# Patient Record
Sex: MALE | Race: BLACK OR AFRICAN AMERICAN | NOT HISPANIC OR LATINO | Employment: UNEMPLOYED | URBAN - METROPOLITAN AREA
[De-identification: names, ages, dates, MRNs, and addresses within clinical notes are randomized per-mention and may not be internally consistent; named-entity substitution may affect disease eponyms.]

---

## 2024-02-28 ENCOUNTER — HOSPITAL ENCOUNTER (EMERGENCY)
Facility: HOSPITAL | Age: 24
Discharge: HOME | End: 2024-02-28
Attending: EMERGENCY MEDICINE
Payer: COMMERCIAL

## 2024-02-28 VITALS
WEIGHT: 170 LBS | RESPIRATION RATE: 15 BRPM | SYSTOLIC BLOOD PRESSURE: 157 MMHG | HEART RATE: 102 BPM | DIASTOLIC BLOOD PRESSURE: 84 MMHG | OXYGEN SATURATION: 100 % | TEMPERATURE: 97.5 F

## 2024-02-28 DIAGNOSIS — R11.0 NAUSEA: Primary | ICD-10-CM

## 2024-02-28 DIAGNOSIS — U07.1 COVID: ICD-10-CM

## 2024-02-28 PROCEDURE — 2500000002 HC RX 250 W HCPCS SELF ADMINISTERED DRUGS (ALT 637 FOR MEDICARE OP, ALT 636 FOR OP/ED): Mod: SE

## 2024-02-28 PROCEDURE — 99284 EMERGENCY DEPT VISIT MOD MDM: CPT | Performed by: EMERGENCY MEDICINE

## 2024-02-28 PROCEDURE — 36415 COLL VENOUS BLD VENIPUNCTURE: CPT

## 2024-02-28 PROCEDURE — 87636 SARSCOV2 & INF A&B AMP PRB: CPT

## 2024-02-28 PROCEDURE — 2500000001 HC RX 250 WO HCPCS SELF ADMINISTERED DRUGS (ALT 637 FOR MEDICARE OP): Mod: SE

## 2024-02-28 PROCEDURE — 2500000005 HC RX 250 GENERAL PHARMACY W/O HCPCS: Mod: SE

## 2024-02-28 PROCEDURE — 99283 EMERGENCY DEPT VISIT LOW MDM: CPT

## 2024-02-28 RX ORDER — METOCLOPRAMIDE 10 MG/1
5 TABLET ORAL EVERY 8 HOURS PRN
Qty: 30 TABLET | Refills: 0 | Status: SHIPPED | OUTPATIENT
Start: 2024-02-28 | End: 2024-03-12 | Stop reason: WASHOUT

## 2024-02-28 RX ORDER — FAMOTIDINE 20 MG/1
20 TABLET, FILM COATED ORAL 2 TIMES DAILY
Qty: 30 TABLET | Refills: 0 | Status: SHIPPED | OUTPATIENT
Start: 2024-02-28 | End: 2024-03-12 | Stop reason: WASHOUT

## 2024-02-28 RX ORDER — ONDANSETRON 4 MG/1
4 TABLET, ORALLY DISINTEGRATING ORAL ONCE
Status: COMPLETED | OUTPATIENT
Start: 2024-02-28 | End: 2024-02-28

## 2024-02-28 RX ORDER — OMEPRAZOLE 20 MG/1
20 CAPSULE, DELAYED RELEASE ORAL DAILY
Qty: 30 CAPSULE | Refills: 0 | Status: SHIPPED | OUTPATIENT
Start: 2024-02-28 | End: 2024-03-12 | Stop reason: WASHOUT

## 2024-02-28 RX ORDER — PANTOPRAZOLE SODIUM 40 MG/1
40 TABLET, DELAYED RELEASE ORAL ONCE
Status: COMPLETED | OUTPATIENT
Start: 2024-02-28 | End: 2024-02-28

## 2024-02-28 RX ORDER — PANTOPRAZOLE SODIUM 40 MG/1
40 TABLET, DELAYED RELEASE ORAL
Status: DISCONTINUED | OUTPATIENT
Start: 2024-02-29 | End: 2024-02-28

## 2024-02-28 RX ORDER — FAMOTIDINE 40 MG/1
20 TABLET, FILM COATED ORAL ONCE
Status: COMPLETED | OUTPATIENT
Start: 2024-02-28 | End: 2024-02-28

## 2024-02-28 RX ORDER — ONDANSETRON 4 MG/1
4 TABLET, ORALLY DISINTEGRATING ORAL EVERY 8 HOURS PRN
Qty: 15 TABLET | Refills: 0 | Status: SHIPPED | OUTPATIENT
Start: 2024-02-28 | End: 2024-02-28 | Stop reason: ALTCHOICE

## 2024-02-28 RX ADMIN — PANTOPRAZOLE SODIUM 40 MG: 40 TABLET, DELAYED RELEASE ORAL at 16:48

## 2024-02-28 RX ADMIN — FAMOTIDINE 20 MG: 40 TABLET ORAL at 16:37

## 2024-02-28 RX ADMIN — ONDANSETRON 4 MG: 4 TABLET, ORALLY DISINTEGRATING ORAL at 16:36

## 2024-02-28 ASSESSMENT — COLUMBIA-SUICIDE SEVERITY RATING SCALE - C-SSRS
6. HAVE YOU EVER DONE ANYTHING, STARTED TO DO ANYTHING, OR PREPARED TO DO ANYTHING TO END YOUR LIFE?: NO
2. HAVE YOU ACTUALLY HAD ANY THOUGHTS OF KILLING YOURSELF?: NO
1. IN THE PAST MONTH, HAVE YOU WISHED YOU WERE DEAD OR WISHED YOU COULD GO TO SLEEP AND NOT WAKE UP?: NO

## 2024-02-28 ASSESSMENT — PAIN SCALES - GENERAL: PAINLEVEL_OUTOF10: 0 - NO PAIN

## 2024-02-28 ASSESSMENT — PAIN - FUNCTIONAL ASSESSMENT: PAIN_FUNCTIONAL_ASSESSMENT: 0-10

## 2024-02-28 NOTE — ED TRIAGE NOTES
Pt c/o nausea without emesis, and fatigue x1 wk. No acute change today. Pt denies any other symptoms or known sick contacts.

## 2024-02-28 NOTE — DISCHARGE INSTRUCTIONS
Please return to the emergency department, should you have any worsening symptoms, are unable to get an appointment with your primary care physician within the discussed time frame, or have any further concerns.       Please follow up with:  PCP as scheduled.     You make take ibuprofen or tylenol for pain. You may alternate ibuprofen and tylenol every 6 hours for better pain control.    Do not exceed 2400mg of ibuprofen or 4000mg of tylenol in a 24 hour period.      If you were given antibiotics, please complete the entire course. If you are unable to tolerate your antibiotics, please follow up with your primary care doctor or return to the emergency department.

## 2024-02-28 NOTE — ED PROVIDER NOTES
"HPI:      Limitations to History: N/a  Additional History Obtained from: None  External records reviewed: prior EMR notes    Chief Complaint   Patient presents with    Nausea    Fatigue          Gabriel Lin is a 23 y.o. male with no prior medical history presents the ED with nausea while he was playing games roughly 10 days ago.  He was seen at [facility] on 2/10/2024 for this complaint, and improved with Zofran, advised to follow-up with primary care physician.  Overall patient notes about symptoms of nausea that, and resolved suddenly, occasionally associated with a \"weird feeling\" that patient describes as lightheadedness.  Notes questionable congestion today, which is new over the last few days.  Denies any other symptoms, no fevers, chills, cough, sore throat, chest pain, shortness of breath, abdominal pain, vomiting, diarrhea, constipation, or any tingling or numbness in the extremities.       Past Medical History:   Diagnosis Date    Encounter for screening for disorder due to exposure to contaminants     Screening for lead exposure    Pain in unspecified ankle and joints of unspecified foot     Ankle joint pain     Past Surgical History:   Procedure Laterality Date    COLOSTOMY  11/11/2013    Colostomy        No Known Allergies      --------------------------------------------------------------------------------------------------------------------------------------    VS: As documented in the triage note and EMR flowsheet from this visit were reviewed.  Temp 36.4 °C (97.5 °F) HR (!) 102 /84 RR 15 Sat 100 % on      Physical Exam:  GEN:  no acute distress, appears comfortable. Conversational and appropriate.    HEENT: Normocephalic, atraumatic. Conjunctiva pink with no redness or exudates. Hearing grossly intact. Moist mucous membranes.  No nystagmus, EOMI, PERRLA.  CARDIO: Normal rate and regular rhythm. Normal S1, S2  without murmurs, rubs, or gallops.   PULM: Clear to auscultation bilaterally. No " rales, rhonchi, or wheezes. No accessory muscle use or stridor. Speaking in full sentences.  GI: Soft, non-tender, non-distended. No rebound tenderness or guarding.   SKIN: Warm and dry, no rashes, lesions, petechiae, or purpura.  MSK: ROM intact in all 4 extremities without contractures or pain. No peripheral edema, contusions, or wounds.    NEURO: A&Ox3, No focal findings identified. No confusion or gross mental status changes.  PSYCH: Appropriate mood and behavior, converses and responds appropriately during exam.        ---------------------------------------------------------------------------------------------------------------------------------------    Given in the ED:   Medications   famotidine (Pepcid) tablet 20 mg (20 mg oral Given 2/28/24 1637)   ondansetron ODT (Zofran-ODT) disintegrating tablet 4 mg (4 mg oral Given 2/28/24 1636)   pantoprazole (ProtoNix) EC tablet 40 mg (40 mg oral Given 2/28/24 1648)      Work up: All labs and imaging were independently reviewed by me.    Labs Reviewed   CBC   SARS-COV-2 AND INFLUENZA A/B PCR       MDM:  Briefly, this is a otherwise healthy 23-year-old male who presented with nausea and lightheadedness over the last 2 weeks, was seen here, given Zofran, Pepcid, and Protonix with improvement of his symptoms.  Low suspicion for intracranial abnormality given no specific triggers for nausea, not associate with any vomiting, with steady gait and non-focal deficits.  Symptoms are likely related to reflux, as patient notes that he has previously had reflux, but has stopped taking his proton pump inhibitor.  Swabs for influenza were also performed to rule out viral illness due his symptoms.  Patient was previously seen at [facility], was given Zofran, and his symptoms improved.  Advised him to follow-up with his primary care, noted that he has a primary care appointment scheduled in the near future.    Impression:   1. Nausea        Discharge Medications: Zofran, famotidine, and  omeprazole    Social Determinants of Health: Patient is currently self-employed, denies any issues with transportation.  Notes that he is able to follow-up with primary care, and has a future appointment scheduled.    Plan and Disposition: discharge home, advised return if worse. They understand return precautions and discharge instructions. Patient was in agreement with this plan.        Ekta Vasquez DO  Emergency Medicine, PGY-2    Patient was seen and evaluated by the attending physician. The attending ED physician agrees with the plan. Patient and/or patient´s representative was counseled regarding labs, imaging, likely diagnosis, and plan. All questions were answered.  Disclaimer: This note was dictated by speech recognition.  Attempt at proofreading was made to minimize errors.  Errors in transcription may be present.  Please call if questions.     Ekta Vasquez DO  Resident  02/28/24 1740       Ekta Vasquez DO  Resident  02/28/24 1803

## 2024-02-29 LAB
FLUAV RNA RESP QL NAA+PROBE: NOT DETECTED
FLUBV RNA RESP QL NAA+PROBE: NOT DETECTED
SARS-COV-2 RNA RESP QL NAA+PROBE: DETECTED

## 2024-03-01 ENCOUNTER — TELEPHONE (OUTPATIENT)
Dept: PHARMACY | Facility: HOSPITAL | Age: 24
End: 2024-03-01
Payer: COMMERCIAL

## 2024-03-01 NOTE — PROGRESS NOTES
"EDPD Note: Rapid Result Review    Reviewed Mr. Gabriel Lin 's chart regarding a positive COVID-19 result that was taken during their recent emergency room visit. The patient was told about these results prior to leaving the emergency department. Therefore, patient was contacted and given proper education.     \"Patient occasionally associated with a \"weird feeling\" that patient describes as lightheadedness, and notes questionable congestion today, which is new over the last few days \"    Patient informed of the results at the ED and I educated him about symptomatic OTC products for nasal congestion and cough. Patient confirmed understanding and to return to the ED in case of worsening symptoms     Latest Reference Range & Units Most Recent   Coronavirus 2019, PCR Not Detected  Detected !  2/28/24 16:32   !: Data is abnormal      No further follow up needed from EDPD Team.     Gamaliel Currie, PharmD      "

## 2024-03-12 ENCOUNTER — OFFICE VISIT (OUTPATIENT)
Dept: PRIMARY CARE | Facility: CLINIC | Age: 24
End: 2024-03-12
Payer: COMMERCIAL

## 2024-03-12 ENCOUNTER — LAB (OUTPATIENT)
Dept: LAB | Facility: LAB | Age: 24
End: 2024-03-12
Payer: COMMERCIAL

## 2024-03-12 VITALS
HEIGHT: 73 IN | BODY MASS INDEX: 21.34 KG/M2 | SYSTOLIC BLOOD PRESSURE: 123 MMHG | WEIGHT: 161 LBS | HEART RATE: 112 BPM | DIASTOLIC BLOOD PRESSURE: 76 MMHG | OXYGEN SATURATION: 100 %

## 2024-03-12 DIAGNOSIS — K29.70 GASTRITIS WITHOUT BLEEDING, UNSPECIFIED CHRONICITY, UNSPECIFIED GASTRITIS TYPE: Primary | ICD-10-CM

## 2024-03-12 DIAGNOSIS — E83.52 HYPERCALCEMIA: ICD-10-CM

## 2024-03-12 DIAGNOSIS — G58.9 PINCHED NERVE IN NECK: ICD-10-CM

## 2024-03-12 DIAGNOSIS — Z00.00 HEALTH CARE MAINTENANCE: ICD-10-CM

## 2024-03-12 DIAGNOSIS — K29.70 GASTRITIS WITHOUT BLEEDING, UNSPECIFIED CHRONICITY, UNSPECIFIED GASTRITIS TYPE: ICD-10-CM

## 2024-03-12 DIAGNOSIS — H61.23 BILATERAL IMPACTED CERUMEN: ICD-10-CM

## 2024-03-12 DIAGNOSIS — R42 DIZZINESS: ICD-10-CM

## 2024-03-12 LAB
25(OH)D3 SERPL-MCNC: 16 NG/ML (ref 30–100)
CA-I BLD-SCNC: 1.22 MMOL/L (ref 1.1–1.33)
PTH-INTACT SERPL-MCNC: 34 PG/ML (ref 18.5–88)

## 2024-03-12 PROCEDURE — 36415 COLL VENOUS BLD VENIPUNCTURE: CPT

## 2024-03-12 PROCEDURE — 82330 ASSAY OF CALCIUM: CPT

## 2024-03-12 PROCEDURE — 82306 VITAMIN D 25 HYDROXY: CPT

## 2024-03-12 PROCEDURE — 83970 ASSAY OF PARATHORMONE: CPT

## 2024-03-12 PROCEDURE — 1036F TOBACCO NON-USER: CPT | Performed by: INTERNAL MEDICINE

## 2024-03-12 PROCEDURE — 99204 OFFICE O/P NEW MOD 45 MIN: CPT | Performed by: INTERNAL MEDICINE

## 2024-03-12 RX ORDER — ONDANSETRON 4 MG/1
4 TABLET, ORALLY DISINTEGRATING ORAL EVERY 8 HOURS PRN
Qty: 20 TABLET | Refills: 0 | Status: SHIPPED | OUTPATIENT
Start: 2024-03-12 | End: 2024-03-19

## 2024-03-12 RX ORDER — OMEPRAZOLE 40 MG/1
40 CAPSULE, DELAYED RELEASE ORAL
Qty: 30 CAPSULE | Refills: 0 | Status: SHIPPED | OUTPATIENT
Start: 2024-03-12 | End: 2024-04-10

## 2024-03-12 ASSESSMENT — PATIENT HEALTH QUESTIONNAIRE - PHQ9
2. FEELING DOWN, DEPRESSED OR HOPELESS: NOT AT ALL
1. LITTLE INTEREST OR PLEASURE IN DOING THINGS: NOT AT ALL
SUM OF ALL RESPONSES TO PHQ9 QUESTIONS 1 AND 2: 0

## 2024-03-12 NOTE — ASSESSMENT & PLAN NOTE
May be related to the cerumen impaction.  Recommend Debrox 3 drops 3 times a day for 5 days.  Referral to ENT for removal

## 2024-03-12 NOTE — PROGRESS NOTES
Subjective   Patient ID: Gabriel Lin is a 23 y.o. male who presents for No chief complaint on file..    HPI       Patient is a 23-year-old male with no significant past medical history presents as follow-up from recent ER visit.  Patient went to the emergency room due to dizziness and epigastric pain.  He was given famotidine and Reglan however no significant improvement.  He states that the symptoms are worse when he eats.  Notes particular foods cause more symptoms.  He is never seen a gastroenterologist.    Patient also complaining of the left shoulder pinched nerve.  He states when the pain is present it is quite quick to him and causes weakness in the arm.  It goes away quite quickly currently no weakness in the arm     patient also noted to have an elevated calcium in the emergency room.  He does not take vitamin D Supplementation          Review of Systems  Constitutional: No fever or chills  Cardiovascular: no chest pain, no palpitations and no syncope.   Respiratory: no cough, no shortness of breath during exertion and no shortness of breath at rest.   Gastrointestinal: no abdominal pain, no nausea and no vomiting.  Neuro: No Headache, no dizziness    Objective   There were no vitals taken for this visit.    Physical Exam  Constitutional: Alert and in no acute distress. Well developed, well nourished  Head and Face: Head and face: Normal.    Cardiovascular: Heart rate and rhythm were normal, normal S1 and S2. No peripheral edema.   Pulmonary: No respiratory distress. Clear bilateral breath sounds.  Musculoskeletal: Gait and station: Normal. Muscle strength/tone: Normal.   Skin: Normal skin color and pigmentation, normal skin turgor, and no rash.    Psychiatric: Judgment and insight: Intact. Mood and affect: Normal.    Procedures    Lab Results   Component Value Date    WBC 8.2 04/13/2023    HGB 16.0 04/13/2023    HCT 45.2 04/13/2023     04/13/2023    ALT 11 03/30/2023    AST 20 03/30/2023    NA  140 04/13/2023    K 4.0 04/13/2023     04/13/2023    CREATININE 1.11 04/13/2023    BUN 11 04/13/2023    CO2 28 04/13/2023       CT soft tissue neck w IV contrast  Narrative: Interpreted By:  SILVINA YEN MD  MRN: [id number]  Patient Name: ALL DEAN     STUDY:  CT NECK WITH CONTRAST;  4/13/2023 4:41 pm     INDICATION:  previous CT neck ~2 weeks ago given sore throat. Concern for  R-sided PTA, Lie Flat: Yes .     COMPARISON:  03/31/2023.     ACCESSION NUMBER(S):  [id number]     ORDERING CLINICIAN:  RENARD STAFFORD     TECHNIQUE:  Axial CT images of the neck were obtained.  The patient received 75  mL Omnipaque 350 intravenous contrast agent. The images were  reformatted in angled axial, coronal and sagittal planes.     FINDINGS:  Limited intracranial images demonstrates no significant mass effect.  Visualized orbits and globes are unremarkable.     Nasopharynx is unremarkable.     There is asymmetric enlargement of the right palatine tonsil which is  likely secondary to an infectious/inflammatory process. There is a  1.5 x 0.9 x 1.6 cm rim enhancing fluid collection within the right  peritonsillar region compatible with an abscess. There is mild  narrowing of the airway at the level of the tonsils.     Mild prominence of the left palatine tonsils likely reactive. Limited  evaluation of the oral cavity secondary to dental amalgam artifact.  Base of tongue and epiglottis are unremarkable. Asymmetry within the  hypopharynx likely represents coaptation of mucosal surfaces on the  right. The vocal cords are adducted which limits evaluation.  Subglottic trachea is unremarkable.     There is expected enhancement within the carotid sheath structures.  Bilateral borderline lymph nodes are likely reactive in the setting  of infection and or inflammation.     Thyroid, submandibular glands and parotid glands are unremarkable.     Evaluation through the lung apices are predominantly clear. Regional  osseous structures are  unremarkable.     Mild mucosal thickening within the paranasal sinuses. Odontogenic  disease. There is a periapical lucency with breech of the buccal  cortex involving a mandibular molar on the right.     Impression: Asymmetric enlargement of the right palatine tonsil which is likely  secondary to an infectious/inflammatory process.     There is a 1.5 x 0.9 x 1.6 cm rim enhancing fluid collection within  the right peritonsillar region compatible with an abscess. There is  mild narrowing of the airway at the level of the tonsils.     Prominent lymph nodes bilaterally are likely reactive in the setting  of infection and or inflammation.     Odontogenic disease.            Assessment/Plan   Problem List Items Addressed This Visit             ICD-10-CM    Gastritis without bleeding - Primary K29.70     Start omeprazole 40.  Recommend small frequent meals.  Avoid eating 2 hours before bed.  Follow-up 1 month.  If no improvement consider referral to gastroenterology         Relevant Medications    omeprazole (PriLOSEC) 40 mg DR capsule    ondansetron ODT (Zofran-ODT) 4 mg disintegrating tablet    Other Relevant Orders    Vitamin D 25-Hydroxy,Total (for eval of Vitamin D levels)    Hypercalcemia E83.52     Check PTH vitamin D and ionized calcium.  Follow-up 1 month for reevaluation         Relevant Orders    Calcium, ionized    PTH, intact    Pinched nerve in neck G58.9     Referral to massage therapy         Relevant Orders    Referral to [facility]    Dizziness R42     May be related to the cerumen impaction.  Recommend Debrox 3 drops 3 times a day for 5 days.  Referral to ENT for removal          Other Visit Diagnoses         Codes    Health care maintenance     Z00.00    Relevant Orders    Referral to Ophthalmology    Bilateral impacted cerumen     H61.23    Relevant Orders    Referral to ENT

## 2024-03-12 NOTE — ASSESSMENT & PLAN NOTE
Start omeprazole 40.  Recommend small frequent meals.  Avoid eating 2 hours before bed.  Follow-up 1 month.  If no improvement consider referral to gastroenterology

## 2024-03-15 ENCOUNTER — HOSPITAL ENCOUNTER (EMERGENCY)
Facility: HOSPITAL | Age: 24
Discharge: HOME | End: 2024-03-15
Attending: EMERGENCY MEDICINE
Payer: COMMERCIAL

## 2024-03-15 VITALS
HEART RATE: 68 BPM | OXYGEN SATURATION: 99 % | TEMPERATURE: 97.6 F | BODY MASS INDEX: 21.2 KG/M2 | SYSTOLIC BLOOD PRESSURE: 127 MMHG | RESPIRATION RATE: 14 BRPM | WEIGHT: 160 LBS | HEIGHT: 73 IN | DIASTOLIC BLOOD PRESSURE: 66 MMHG

## 2024-03-15 DIAGNOSIS — R20.2 PARESTHESIA: Primary | ICD-10-CM

## 2024-03-15 PROCEDURE — 99281 EMR DPT VST MAYX REQ PHY/QHP: CPT

## 2024-03-15 PROCEDURE — 99283 EMERGENCY DEPT VISIT LOW MDM: CPT | Performed by: EMERGENCY MEDICINE

## 2024-03-15 PROCEDURE — 99283 EMERGENCY DEPT VISIT LOW MDM: CPT

## 2024-03-15 ASSESSMENT — PAIN - FUNCTIONAL ASSESSMENT: PAIN_FUNCTIONAL_ASSESSMENT: 0-10

## 2024-03-15 NOTE — DISCHARGE INSTRUCTIONS
Please follow-up with your primary care physician.  Return to emergency department for reassessment if new or worsening symptoms.  Especially persistent weakness or numbness, severe headache, vision changes.

## 2024-03-15 NOTE — ED TRIAGE NOTES
Pt states whenever he moves his neck the wring way he feels L. Sided weakness, unsure if its a pinch nerve. Pt denies injury to the area. No lost of B/B

## 2024-03-15 NOTE — ED PROVIDER NOTES
CC: Neck Pain and Weakness, Gen     HPI:  Patient is a 23-year-old male who presents for evaluation of intermittent left upper extremity weakness.  This is been going on for 2 to 3 weeks and will happen at least once a day.He denies any inciting factors including heavy lifting or injury.  He is not having any current numbness tingling or weakness.  These episodes last for a few minutes and then cease.  He states that this will happen when he turns his head a certain way and it is not always the same direction.  He did see his primary care doctor regarding this a couple of days ago and was referred to massage therapy.  He is here with another family member who is also getting checked out in the ED and wanted to come for a second opinion.    Records Reviewed:  Recent available ED and inpatient notes reviewed in EMR.    PMHx/PSHx:  Per HPI.   - has a past medical history of Encounter for screening for disorder due to exposure to contaminants and Pain in unspecified ankle and joints of unspecified foot.  - has a past surgical history that includes Colostomy (11/11/2013).    Medications:  Reviewed in EMR. See EMR for complete list of medications and doses.    Allergies:  Ragweed    Social History:  - Tobacco:  reports that he has never smoked. He has never been exposed to tobacco smoke. He has never used smokeless tobacco.   - Alcohol:  reports current alcohol use.   - Illicit Drugs:  reports no history of drug use.     ROS:  Per HPI.     Physical Exam  Vitals and nursing note reviewed.   Constitutional:       General: He is not in acute distress.     Appearance: Normal appearance. He is well-developed and normal weight.   HENT:      Head: Normocephalic and atraumatic.      Mouth/Throat:      Mouth: Mucous membranes are moist.   Eyes:      Extraocular Movements: Extraocular movements intact.      Conjunctiva/sclera: Conjunctivae normal.      Pupils: Pupils are equal, round, and reactive to light.   Cardiovascular:       Rate and Rhythm: Normal rate and regular rhythm.      Pulses: Normal pulses.      Heart sounds: No murmur heard.  Pulmonary:      Effort: Pulmonary effort is normal. No respiratory distress.      Breath sounds: Normal breath sounds.   Abdominal:      Palpations: Abdomen is soft.      Tenderness: There is no abdominal tenderness. There is no guarding.   Musculoskeletal:         General: No swelling, tenderness or deformity.      Cervical back: Normal range of motion and neck supple. No tenderness.      Right lower leg: No edema.      Left lower leg: No edema.   Skin:     General: Skin is warm and dry.      Capillary Refill: Capillary refill takes less than 2 seconds.   Neurological:      Mental Status: He is alert and oriented to person, place, and time. Mental status is at baseline.      GCS: GCS eye subscore is 4. GCS verbal subscore is 5. GCS motor subscore is 6.      Cranial Nerves: Cranial nerves 2-12 are intact. No cranial nerve deficit, dysarthria or facial asymmetry.      Sensory: Sensation is intact. No sensory deficit.      Motor: Motor function is intact. No weakness.      Coordination: Coordination is intact. Coordination normal.      Gait: Gait is intact. Gait normal.      Deep Tendon Reflexes: Reflexes are normal and symmetric.   Psychiatric:         Mood and Affect: Mood normal.         Behavior: Behavior normal.           Assessment and Plan:  Patient is a 23-year-old male who presents for evaluation of intermittent weakness in his left upper extremity that is been going on for a couple of weeks.  He states that this has been coming and going.  He is not currently having the symptoms.  He is hemodynamically stable on arrival though tachycardic in triage.  On repeat vitals he is heart rate of 68.  Most likely related to ambulation into the emergency department.  He is not currently having any symptoms of weakness or paresthesias.  Attempted provocative neck flexion extension sidebending and rotation  without any change in neuroexam.  He has intact sensation pulses and strength in bilateral upper extremities.  He has not had any injuries to the neck no manipulation no recent heavy lifting.  On chart review he has had MRIs and CT imaging over the last couple of years with some concerns for demyelinating process without focal findings on these imaging.  With these negative imaging, while considered imaging today I have low concern for fracture that would be seen on x-ray or CT and low concern for acute process requiring emergent MRI.  Patient is not in any pain requiring any medical intervention.  Patient to be discharged in stable condition with referral to primary care and neurology for further outpt workup.  Patient discharged home in stable condition.    ED Course:  Diagnoses as of 03/15/24 1916   Paresthesia      Pt seen and discussed with Dr. Ghislaine Desai DO  PGY-2 Emergency Medicine        Tatiana Desai DO  Resident  03/15/24 1932

## 2024-04-08 DIAGNOSIS — K29.70 GASTRITIS WITHOUT BLEEDING, UNSPECIFIED CHRONICITY, UNSPECIFIED GASTRITIS TYPE: ICD-10-CM

## 2024-04-10 RX ORDER — OMEPRAZOLE 40 MG/1
40 CAPSULE, DELAYED RELEASE ORAL
Qty: 30 CAPSULE | Refills: 0 | Status: SHIPPED | OUTPATIENT
Start: 2024-04-10 | End: 2024-05-10

## 2024-04-12 ENCOUNTER — APPOINTMENT (OUTPATIENT)
Dept: PRIMARY CARE | Facility: CLINIC | Age: 24
End: 2024-04-12
Payer: COMMERCIAL

## 2024-05-02 ENCOUNTER — OFFICE VISIT (OUTPATIENT)
Dept: PRIMARY CARE | Facility: CLINIC | Age: 24
End: 2024-05-02
Payer: COMMERCIAL

## 2024-05-02 VITALS
SYSTOLIC BLOOD PRESSURE: 148 MMHG | HEART RATE: 99 BPM | DIASTOLIC BLOOD PRESSURE: 82 MMHG | BODY MASS INDEX: 20.45 KG/M2 | WEIGHT: 155 LBS | OXYGEN SATURATION: 98 %

## 2024-05-02 DIAGNOSIS — K29.70 GASTRITIS WITHOUT BLEEDING, UNSPECIFIED CHRONICITY, UNSPECIFIED GASTRITIS TYPE: Primary | ICD-10-CM

## 2024-05-02 PROCEDURE — 1036F TOBACCO NON-USER: CPT | Performed by: INTERNAL MEDICINE

## 2024-05-02 PROCEDURE — 99213 OFFICE O/P EST LOW 20 MIN: CPT | Performed by: INTERNAL MEDICINE

## 2024-05-02 NOTE — PROGRESS NOTES
Subjective   Patient ID: Gabriel Lin is a 23 y.o. male who presents for Follow-up.    HPI       Patient is a 23-year-old male with no significant past medical history presents as follow-up.  Last visit patient complaining of epigastric pain thought to be due to gastritis.  Patient was started on a PPI for 30 days.  Symptoms for the most part had resolved.  He is doing well off the medication at this time.          Review of Systems  Constitutional: No fever or chills  Cardiovascular: no chest pain, no palpitations and no syncope.   Respiratory: no cough, no shortness of breath during exertion and no shortness of breath at rest.   Gastrointestinal: no abdominal pain, no nausea and no vomiting.  Neuro: No Headache, no dizziness    Objective   /82   Pulse 99   Wt 70.3 kg (155 lb)   SpO2 98%   BMI 20.45 kg/m²     Physical Exam  Constitutional: Alert and in no acute distress. Well developed, well nourished  Head and Face: Head and face: Normal.    Cardiovascular: Heart rate and rhythm were normal, normal S1 and S2. No peripheral edema.   Pulmonary: No respiratory distress. Clear bilateral breath sounds.  Musculoskeletal: Gait and station: Normal. Muscle strength/tone: Normal.   Skin: Normal skin color and pigmentation, normal skin turgor, and no rash.    Psychiatric: Judgment and insight: Intact. Mood and affect: Normal.    Procedures    Lab Results   Component Value Date    WBC 8.2 04/13/2023    HGB 16.0 04/13/2023    HCT 45.2 04/13/2023     04/13/2023    ALT 11 03/30/2023    AST 20 03/30/2023     04/13/2023    K 4.0 04/13/2023     04/13/2023    CREATININE 1.11 04/13/2023    BUN 11 04/13/2023    CO2 28 04/13/2023       CT soft tissue neck w IV contrast  Narrative: Interpreted By:  SILVINA YEN MD  MRN: [id number]  Patient Name: GABRIEL LIN     STUDY:  CT NECK WITH CONTRAST;  4/13/2023 4:41 pm     INDICATION:  previous CT neck ~2 weeks ago given sore throat. Concern for  R-sided  PTA, Lie Flat: Yes .     COMPARISON:  03/31/2023.     ACCESSION NUMBER(S):  [id number]     ORDERING CLINICIAN:  RENARD STAFFORD     TECHNIQUE:  Axial CT images of the neck were obtained.  The patient received 75  mL Omnipaque 350 intravenous contrast agent. The images were  reformatted in angled axial, coronal and sagittal planes.     FINDINGS:  Limited intracranial images demonstrates no significant mass effect.  Visualized orbits and globes are unremarkable.     Nasopharynx is unremarkable.     There is asymmetric enlargement of the right palatine tonsil which is  likely secondary to an infectious/inflammatory process. There is a  1.5 x 0.9 x 1.6 cm rim enhancing fluid collection within the right  peritonsillar region compatible with an abscess. There is mild  narrowing of the airway at the level of the tonsils.     Mild prominence of the left palatine tonsils likely reactive. Limited  evaluation of the oral cavity secondary to dental amalgam artifact.  Base of tongue and epiglottis are unremarkable. Asymmetry within the  hypopharynx likely represents coaptation of mucosal surfaces on the  right. The vocal cords are adducted which limits evaluation.  Subglottic trachea is unremarkable.     There is expected enhancement within the carotid sheath structures.  Bilateral borderline lymph nodes are likely reactive in the setting  of infection and or inflammation.     Thyroid, submandibular glands and parotid glands are unremarkable.     Evaluation through the lung apices are predominantly clear. Regional  osseous structures are unremarkable.     Mild mucosal thickening within the paranasal sinuses. Odontogenic  disease. There is a periapical lucency with breech of the buccal  cortex involving a mandibular molar on the right.     Impression: Asymmetric enlargement of the right palatine tonsil which is likely  secondary to an infectious/inflammatory process.     There is a 1.5 x 0.9 x 1.6 cm rim enhancing fluid collection  within  the right peritonsillar region compatible with an abscess. There is  mild narrowing of the airway at the level of the tonsils.     Prominent lymph nodes bilaterally are likely reactive in the setting  of infection and or inflammation.     Odontogenic disease.            Assessment/Plan   Problem List Items Addressed This Visit             ICD-10-CM    Gastritis without bleeding - Primary K29.70    Relevant Orders    Follow Up In [facility] - PCP - Established     Resolved after completion of the PPI.  Continue off medication indefinitely unless symptoms return.  If symptoms return consider referral to gastroenterology for evaluation    Follow-up 2 to 3 months for weight check and to recheck blood pressure

## 2024-07-11 ENCOUNTER — APPOINTMENT (OUTPATIENT)
Dept: OPHTHALMOLOGY | Facility: CLINIC | Age: 24
End: 2024-07-11
Payer: COMMERCIAL